# Patient Record
Sex: MALE | Race: WHITE
[De-identification: names, ages, dates, MRNs, and addresses within clinical notes are randomized per-mention and may not be internally consistent; named-entity substitution may affect disease eponyms.]

---

## 2018-04-29 ENCOUNTER — HOSPITAL ENCOUNTER (EMERGENCY)
Dept: HOSPITAL 14 - H.ER | Age: 63
Discharge: HOME | End: 2018-04-29
Payer: COMMERCIAL

## 2018-04-29 VITALS — BODY MASS INDEX: 30.4 KG/M2

## 2018-04-29 VITALS
HEART RATE: 65 BPM | SYSTOLIC BLOOD PRESSURE: 132 MMHG | TEMPERATURE: 97.9 F | RESPIRATION RATE: 16 BRPM | DIASTOLIC BLOOD PRESSURE: 74 MMHG

## 2018-04-29 VITALS — OXYGEN SATURATION: 100 %

## 2018-04-29 DIAGNOSIS — R06.00: Primary | ICD-10-CM

## 2018-04-29 DIAGNOSIS — Z95.5: ICD-10-CM

## 2018-04-29 DIAGNOSIS — Z79.82: ICD-10-CM

## 2018-04-29 DIAGNOSIS — E78.00: ICD-10-CM

## 2018-04-29 DIAGNOSIS — I10: ICD-10-CM

## 2018-04-29 DIAGNOSIS — I25.10: ICD-10-CM

## 2018-04-29 LAB
BASOPHILS # BLD AUTO: 0.1 K/UL (ref 0–0.2)
BASOPHILS NFR BLD: 1.2 % (ref 0–2)
BNP SERPL-MCNC: 59 PG/ML (ref 0–900)
BUN SERPL-MCNC: 15 MG/DL (ref 9–20)
CALCIUM SERPL-MCNC: 9.6 MG/DL (ref 8.4–10.2)
EOSINOPHIL # BLD AUTO: 0.3 K/UL (ref 0–0.7)
EOSINOPHIL NFR BLD: 3.7 % (ref 0–4)
ERYTHROCYTE [DISTWIDTH] IN BLOOD BY AUTOMATED COUNT: 12.7 % (ref 11.5–14.5)
GFR NON-AFRICAN AMERICAN: > 60
HGB BLD-MCNC: 13.4 G/DL (ref 12–18)
LYMPHOCYTES # BLD AUTO: 2.1 K/UL (ref 1–4.3)
LYMPHOCYTES NFR BLD AUTO: 28 % (ref 20–40)
MCH RBC QN AUTO: 29.5 PG (ref 27–31)
MCHC RBC AUTO-ENTMCNC: 33.5 G/DL (ref 33–37)
MCV RBC AUTO: 88.2 FL (ref 80–94)
MONOCYTES # BLD: 0.7 K/UL (ref 0–0.8)
MONOCYTES NFR BLD: 9 % (ref 0–10)
NEUTROPHILS # BLD: 4.4 K/UL (ref 1.8–7)
NEUTROPHILS NFR BLD AUTO: 58.1 % (ref 50–75)
NRBC BLD AUTO-RTO: 0.1 % (ref 0–0)
PLATELET # BLD: 183 K/UL (ref 130–400)
PMV BLD AUTO: 9.6 FL (ref 7.2–11.7)
RBC # BLD AUTO: 4.56 MIL/UL (ref 4.4–5.9)
WBC # BLD AUTO: 7.7 K/UL (ref 4.8–10.8)

## 2018-04-29 NOTE — ED PDOC
HPI: SOB/CHF/COPD


Time Seen by Provider: 04/29/18 09:05


Chief Complaint (Nursing): Shortness Of Breath


Chief Complaint (Provider): Shortness Of Breath


History Per: Patient


History/Exam Limitations: no limitations


Onset/Duration Of Symptoms: Other (Since morning)


Current Symptoms Are (Timing): Better


Associated Symptoms: denies: Fever, Chest Pain, Bloody Cough, Productive Cough, 

Ankle/Leg Swelling


Additional Complaint(s): 


62 y/o male presents to the ED complaining of shortness of breath since 

morning. Reports that he couldnt sleep in the morning due to difficulty 

breathing. Denies cough, fever, chest pain, leg swelling or any further medical 

complaints.





PMD: Kai Hebert





Past Medical History


Reviewed: Historical Data, Nursing Documentation, Vital Signs


Vital Signs: 


 Last Vital Signs











Temp  99.3 F   04/29/18 08:54


 


Pulse  68   04/29/18 08:54


 


Resp  14   04/29/18 09:17


 


BP  147/78   04/29/18 08:54


 


Pulse Ox  100   04/29/18 11:17














- Medical History


PMH: CAD, HTN, Hypercholesterolemia


   Denies: Asthma, COPD





- Surgical History


Surgical History: Coronary Stent (5 stents)





- Family History


Family History: States: Unknown Family Hx





- Social History


Current smoker - smoking cessation education provided: No (Never Smoked)


Alcohol: None


Drugs: Denies





- Immunization History


Hx Tetanus Toxoid Vaccination: No


Hx Influenza Vaccination: No





- Home Medications


Home Medications: 


 Ambulatory Orders











 Medication  Instructions  Recorded


 


Aspirin [Aspirin]  08/23/14


 


Ciprofloxacin [Cipro] 500 mg PO BID #6 ml 08/23/14


 


Ibuprofen 600 mg PO Q6 PRN #15 tab 08/23/14


 


Metoprolol Tartrate [Metoprolol DAILY 08/23/14





Tartrate]  














- Allergies


Allergies/Adverse Reactions: 


 Allergies











Allergy/AdvReac Type Severity Reaction Status Date / Time


 


No Known Allergies Allergy   Verified 08/23/14 09:44














Wells Criteria for PE





- Wells Criteria for Pulmonary Embolism


Clinical Signs and Symptoms of DVT: No


P.E is #1 Diagnosis, or Equally Likely: No


Heart Rate >100: No


Immobilization at least 3 days;Surgery previous 4 weeks: No


Previous, objectively diagnosed PE or DVT: No


Hemoptysis: No


Malignancy w/treatment within 6 months, or palliative: No


Total Score: 0





Review of Systems


ROS Statement: Except As Marked, All Systems Reviewed And Found Negative (As 

per HPI, otherwise negative)


Constitutional: Negative for: Fever


Cardiovascular: Negative for: Chest Pain


Respiratory: Positive for: Shortness of Breath.  Negative for: Cough


Musculoskeletal: Negative for: Other (leg swelling)





Physical Exam





- Reviewed


Nursing Documentation Reviewed: Yes


Vital Signs Reviewed: Yes





- Physical Exam


Appears: Positive for: Non-toxic, No Acute Distress


Head Exam: Positive for: ATRAUMATIC, NORMAL INSPECTION, NORMOCEPHALIC


Skin: Positive for: Normal Color, Warm, Dry


Eye Exam: Positive for: EOMI, Normal appearance, PERRL


ENT: Positive for: Normal ENT Inspection


Neck: Positive for: Normal, Painless ROM, Supple


Cardiovascular/Chest: Positive for: Regular Rate, Rhythm.  Negative for: Murmur


Respiratory: Positive for: Normal Breath Sounds.  Negative for: Accessory 

Muscle Use, Respiratory Distress


Gastrointestinal/Abdominal: Positive for: Normal Exam, Soft.  Negative for: 

Tenderness


Back: Positive for: Normal Inspection


Extremity: Positive for: Normal ROM.  Negative for: Deformity


Neurologic/Psych: Positive for: Alert, Oriented (x3)





- Laboratory Results


Result Diagrams: 


 04/29/18 09:40





 04/29/18 09:40





- ECG


ECG Rhythm: Positive for: Sinus Rhythm (Normal sinus rhythm at 64 beats/min)


O2 Sat by Pulse Oximetry: 100 (RA)


Pulse Ox Interpretation: Normal





Medical Decision Making


Medical Decision Making: 


Time:  09:37





Initial Impression: difficulty breathing





Differential diagnosis: ACS, CHF, PE





Plan:


EKG


BNP


BMP


Troponin I


CBC w/ differential


D Dimer


Chest x-ray


Reevaluation





Time: 09:54


Chest x-ray





FINDINGS:


LUNGS:


The lungs are well inflated and clear.


PLEURA:


No pneumothorax or pleural fluid seen.


CARDIOVASCULAR:


Normal.


OSSEOUS STRUCTURES:


No significant abnormalities.


VISUALIZED UPPER ABDOMEN:


Normal.





OTHER FINDINGS:


None. 





IMPRESSION:


No active pulmonary disease.





1100 Patient is improved. No complains at this time. No CP or SOB. 


--------------------------------------------------------------------------------

-----------------


Scribe Attestation:


Documented by Dilma Major acting as a scribe for Sky Fajardo MD.


      


MD Scribe Attestation:


All medical record entries made by the Scribe were at my direction and 

personally dictated by me. I have reviewed the chart and agree that the record 

accurately reflects my personal performance of the history, physical exam, 

medical decision making, and the department course for this patient. I have 

also personally directed, reviewed, and agree with the discharge instructions 

and disposition.








Disposition





- Clinical Impression


Clinical Impression: 


 Dyspnea








- Patient ED Disposition


Is Patient to be Admitted: No


Doctor Will See Patient In The: Office


Counseled Patient/Family Regarding: Studies Performed, Diagnosis, Need For 

Followup





- Disposition


Referrals: 


Chino Fernandes MD [Medical Doctor] - 


Disposition Time: 11:26


Condition: GOOD


Additional Instructions: 


Return for worsening or chest pain. Follow up with your PCP in 2-3 days. Take 

your medications as instructed. 


Instructions:  Shortness of Breath (Dyspnea) (DC) Yes

## 2018-04-29 NOTE — RAD
PROCEDURE:  CHEST RADIOGRAPH, 1 VIEW



HISTORY:

Dyspnea 



COMPARISON:

05/26/2009.



FINDINGS:



LUNGS:

The lungs are well inflated and clear.



PLEURA:

No pneumothorax or pleural fluid seen.



CARDIOVASCULAR:

Normal.



OSSEOUS STRUCTURES:

No significant abnormalities.



VISUALIZED UPPER ABDOMEN:

Normal.



OTHER FINDINGS:

None. 



IMPRESSION:

No active pulmonary disease.

## 2018-04-30 NOTE — CARD
--------------- APPROVED REPORT --------------





EKG Measurement

Heart [id number]

CO [id number]

[id number]

[id number]

[id number]



<Conclusion>

Normal sinus rhythm

Incomplete RBBB

Minimal voltage criteria for LVH, may be normal variant

Borderline ECG